# Patient Record
Sex: MALE | Race: WHITE | NOT HISPANIC OR LATINO | Employment: FULL TIME | URBAN - METROPOLITAN AREA
[De-identification: names, ages, dates, MRNs, and addresses within clinical notes are randomized per-mention and may not be internally consistent; named-entity substitution may affect disease eponyms.]

---

## 2024-02-28 ENCOUNTER — HOSPITAL ENCOUNTER (EMERGENCY)
Facility: HOSPITAL | Age: 64
Discharge: HOME | End: 2024-02-28
Attending: EMERGENCY MEDICINE
Payer: COMMERCIAL

## 2024-02-28 ENCOUNTER — APPOINTMENT (EMERGENCY)
Dept: RADIOLOGY | Facility: HOSPITAL | Age: 64
End: 2024-02-28
Payer: COMMERCIAL

## 2024-02-28 VITALS
BODY MASS INDEX: 19.88 KG/M2 | SYSTOLIC BLOOD PRESSURE: 118 MMHG | OXYGEN SATURATION: 100 % | RESPIRATION RATE: 18 BRPM | WEIGHT: 142 LBS | HEIGHT: 71 IN | TEMPERATURE: 98.2 F | DIASTOLIC BLOOD PRESSURE: 75 MMHG | HEART RATE: 72 BPM

## 2024-02-28 DIAGNOSIS — S92.354A CLOSED NONDISPLACED FRACTURE OF FIFTH METATARSAL BONE OF RIGHT FOOT, INITIAL ENCOUNTER: Primary | ICD-10-CM

## 2024-02-28 PROCEDURE — 99283 EMERGENCY DEPT VISIT LOW MDM: CPT

## 2024-02-28 PROCEDURE — 99284 EMERGENCY DEPT VISIT MOD MDM: CPT

## 2024-02-28 PROCEDURE — 73630 X-RAY EXAM OF FOOT: CPT

## 2024-02-28 RX ORDER — ACETAMINOPHEN 325 MG/1
650 TABLET ORAL ONCE
Status: COMPLETED | OUTPATIENT
Start: 2024-02-28 | End: 2024-02-28

## 2024-02-28 RX ADMIN — ACETAMINOPHEN 650 MG: 325 TABLET, FILM COATED ORAL at 23:03

## 2024-02-29 ENCOUNTER — OFFICE VISIT (OUTPATIENT)
Dept: PODIATRY | Facility: CLINIC | Age: 64
End: 2024-02-29
Payer: COMMERCIAL

## 2024-02-29 VITALS
DIASTOLIC BLOOD PRESSURE: 65 MMHG | SYSTOLIC BLOOD PRESSURE: 104 MMHG | BODY MASS INDEX: 19.88 KG/M2 | HEART RATE: 82 BPM | WEIGHT: 142 LBS | HEIGHT: 71 IN

## 2024-02-29 DIAGNOSIS — S92.354A CLOSED NONDISPLACED FRACTURE OF FIFTH METATARSAL BONE OF RIGHT FOOT, INITIAL ENCOUNTER: Primary | ICD-10-CM

## 2024-02-29 PROCEDURE — 28470 CLTX METATARSAL FX WO MNP EA: CPT | Performed by: PODIATRIST

## 2024-02-29 PROCEDURE — 99203 OFFICE O/P NEW LOW 30 MIN: CPT | Performed by: PODIATRIST

## 2024-02-29 RX ORDER — OXYCODONE HYDROCHLORIDE 5 MG/1
5 TABLET ORAL
COMMUNITY
Start: 2024-02-06

## 2024-02-29 RX ORDER — LANSOPRAZOLE 30 MG/1
30 CAPSULE, DELAYED RELEASE ORAL 2 TIMES DAILY
COMMUNITY
Start: 2024-02-15

## 2024-02-29 NOTE — DISCHARGE INSTRUCTIONS
Call orthopedic to schedule an appointment. Use crutches, do not put any weight on your right foot. Keep the splint clean and dry. Take acetaminophen (Tylenol) or ibuprofen (Motrin) as needed for pain control.

## 2024-02-29 NOTE — ED PROVIDER NOTES
History  Chief Complaint   Patient presents with   • Foot Injury     Pt reports he rolled his right ankle when getting up from the couch, reports falling, denies hitting head and denies thinners/ASA.     63-year-old male jumped off of his chair to go to his dog and inverted right foot      None       Past Medical History:   Diagnosis Date   • Esophageal cancer (HCC)        Past Surgical History:   Procedure Laterality Date   • ESOPHAGECTOMY         History reviewed. No pertinent family history.  I have reviewed and agree with the history as documented.    E-Cigarette/Vaping   • E-Cigarette Use Never User      E-Cigarette/Vaping Substances   • Nicotine No    • THC No    • CBD No    • Flavoring No    • Other No    • Unknown No      Social History     Tobacco Use   • Smoking status: Never   • Smokeless tobacco: Never   Vaping Use   • Vaping status: Never Used   Substance Use Topics   • Alcohol use: Never   • Drug use: Never       Review of Systems    Physical Exam  Physical Exam    Vital Signs  ED Triage Vitals [02/28/24 2216]   Temperature Pulse Respirations Blood Pressure SpO2   98.2 °F (36.8 °C) 72 18 118/75 100 %      Temp Source Heart Rate Source Patient Position - Orthostatic VS BP Location FiO2 (%)   Temporal Monitor -- Right arm --      Pain Score       5           Vitals:    02/28/24 2216   BP: 118/75   Pulse: 72         Visual Acuity      ED Medications  Medications - No data to display    Diagnostic Studies  Results Reviewed       None                   No orders to display              Procedures  Procedures         ED Course                                             Medical Decision Making           Disposition  Final diagnoses:   None     ED Disposition       None          Follow-up Information    None         Patient's Medications    No medications on file       No discharge procedures on file.    PDMP Review       None            ED Provider  Electronically Signed by

## 2024-02-29 NOTE — ED PROVIDER NOTES
History  Chief Complaint   Patient presents with    Foot Injury     Pt reports he rolled his right ankle when getting up from the couch, reports falling, denies hitting head and denies thinners/ASA.     Florentino is a 62 y/o M with PMH of esophageal cancer in remission presenting to the ER with the c/o right foot injury ~ 1 hour ago. Pt states his dog was barking so he was getting off his couch and he rolled his foot forward. He denies any other injury, did not hit his head, and is not on blood thinners. He did not take anything for the pain. He rates it a 6/10, worse with walking and touching it. He has never had a known fracture in this foot. Describes it as a dull, aching pain. Denies numbness, tingling, or bruising. Just c/o pain and swelling.           None       Past Medical History:   Diagnosis Date    Esophageal cancer (HCC)        Past Surgical History:   Procedure Laterality Date    ESOPHAGECTOMY         History reviewed. No pertinent family history.  I have reviewed and agree with the history as documented.    E-Cigarette/Vaping    E-Cigarette Use Never User      E-Cigarette/Vaping Substances    Nicotine No     THC No     CBD No     Flavoring No     Other No     Unknown No      Social History     Tobacco Use    Smoking status: Never    Smokeless tobacco: Never   Vaping Use    Vaping status: Never Used   Substance Use Topics    Alcohol use: Never    Drug use: Never       Review of Systems   Musculoskeletal:  Positive for arthralgias and joint swelling. Negative for gait problem.   Skin:  Negative for color change.   Neurological:  Negative for dizziness, weakness, light-headedness and numbness.   Psychiatric/Behavioral:  Negative for behavioral problems.    All other systems reviewed and are negative.      Physical Exam  Physical Exam  Vitals and nursing note reviewed.   Constitutional:       General: He is awake.      Appearance: Normal appearance. He is well-developed.   HENT:      Head: Normocephalic and  atraumatic.      Right Ear: External ear normal.      Left Ear: External ear normal.      Nose: Nose normal.      Mouth/Throat:      Mouth: Mucous membranes are moist.   Eyes:      General: No scleral icterus.     Extraocular Movements: Extraocular movements intact.   Cardiovascular:      Rate and Rhythm: Normal rate and regular rhythm.      Heart sounds: Normal heart sounds, S1 normal and S2 normal. No murmur heard.     No gallop.   Musculoskeletal:         General: Swelling, tenderness and signs of injury present. Normal range of motion.      Cervical back: Normal range of motion.      Comments: TTP & Edema to right lateral dorsal foot along 5th metatarsal in midfoot area. NV and Sensation intact. DP and PT 2+. No bruising or erythema.    Skin:     General: Skin is warm and dry.      Coloration: Skin is not pale.      Findings: No bruising, ecchymosis or erythema.   Neurological:      General: No focal deficit present.      Mental Status: He is alert and oriented to person, place, and time.      GCS: GCS eye subscore is 4. GCS verbal subscore is 5. GCS motor subscore is 6.      Sensory: No sensory deficit.      Motor: No weakness.      Gait: Gait normal.   Psychiatric:         Attention and Perception: Attention and perception normal.         Mood and Affect: Mood normal.         Behavior: Behavior normal. Behavior is cooperative.         Vital Signs  ED Triage Vitals [02/28/24 2216]   Temperature Pulse Respirations Blood Pressure SpO2   98.2 °F (36.8 °C) 72 18 118/75 100 %      Temp Source Heart Rate Source Patient Position - Orthostatic VS BP Location FiO2 (%)   Temporal Monitor -- Right arm --      Pain Score       5           Vitals:    02/28/24 2216   BP: 118/75   Pulse: 72         Visual Acuity      ED Medications  Medications   acetaminophen (TYLENOL) tablet 650 mg (650 mg Oral Given 2/28/24 2303)       Diagnostic Studies  Results Reviewed       None                   XR foot 3+ views RIGHT   ED  Interpretation by Sheree Gresham PA-C (02/28 2250)   Fracture base of 5th metatarsal consistent with simon fracture                  Procedures  Splint application    Date/Time: 2/28/2024 10:52 PM    Performed by: Sheree Gresham PA-C  Authorized by: Sheree Gresham PA-C  Universal Protocol:  Procedure performed by: (Kaylan TRINH)  Consent: Verbal consent obtained.  Risks and benefits: risks, benefits and alternatives were discussed  Consent given by: patient  Timeout called at: 2/28/2024 10:55 PM.  Patient understanding: patient states understanding of the procedure being performed  Radiology Images displayed and confirmed. If images not available, report reviewed: imaging studies available  Patient identity confirmed: verbally with patient    Pre-procedure details:     Sensation:  Normal    Skin color:  Pink  Procedure details:     Laterality:  Right    Location:  Foot    Foot:  R foot    Strapping: no      Splint type:  Short leg    Supplies:  Cotton padding  Post-procedure details:     Pain:  Unchanged    Sensation:  Normal    Skin color:  Pink    Patient tolerance of procedure:  Tolerated well, no immediate complications  Comments:      Splinting performed by Kaylan TRINH           ED Course                               SBIRT 22yo+      Flowsheet Row Most Recent Value   Initial Alcohol Screen: US AUDIT-C     1. How often do you have a drink containing alcohol? 0 Filed at: 02/28/2024 2308   2. How many drinks containing alcohol do you have on a typical day you are drinking?  0 Filed at: 02/28/2024 2308   3a. Male UNDER 65: How often do you have five or more drinks on one occasion? 0 Filed at: 02/28/2024 2308   3b. FEMALE Any Age, or MALE 65+: How often do you have 4 or more drinks on one occassion? 0 Filed at: 02/28/2024 2308   Audit-C Score 0 Filed at: 02/28/2024 2308   JELANI: How many times in the past year have you...    Used an illegal drug or used a prescription medication for  non-medical reasons? Never Filed at: 02/28/2024 2308                      Medical Decision Making  Ddx: Simon fx, Pseudojones fx, other L foot fracture, L foot contusion, ligament sprain     Plan: XR foot ordered. ER interpretation- fx seen at base of 5th metatarsal. Will splint and provide crutches for strict non-weight bearing suspicious of simon fracture and will instruct f/u with podiatry. Referral placed. Supportive care/RICE.  Patient was given strict return to ER precautions both verbally and in discharge papers. Patient verbalized understanding and agrees with plan.       Amount and/or Complexity of Data Reviewed  Radiology: ordered and independent interpretation performed.    Risk  OTC drugs.             Disposition  Final diagnoses:   Closed nondisplaced fracture of fifth metatarsal bone of right foot, initial encounter     Time reflects when diagnosis was documented in both MDM as applicable and the Disposition within this note       Time User Action Codes Description Comment    2/28/2024 11:22 PM Sheree Gresham Add [S92.354A] Closed nondisplaced fracture of fifth metatarsal bone of right foot, initial encounter           ED Disposition       ED Disposition   Discharge    Condition   Stable    Date/Time   Wed Feb 28, 2024 2322    Comment   Florentino Maier discharge to home/self care.                   Follow-up Information       Follow up With Specialties Details Why Contact Info Additional Information     [facility] Emergency Department Emergency Medicine Go to  If symptoms worsen [address] [address] [phone] [facility] Emergency Department, [address] [place], [place] [address]    [facility]   [address] [id number] [phone] [facility], [address], [address] [phone]             There are no discharge medications for this patient.          PDMP Review       None            ED Provider  Electronically Signed by             Sheree Gresham PA-C  02/29/24 0138

## 2024-02-29 NOTE — PROGRESS NOTES
"Patient ID: Florentino Maier is a 63 y.o. male Date of Birth 1960       Chief Complaint   Patient presents with    Foot Pain     Right foot fracture             Diagnosis:  1. Closed nondisplaced fracture of fifth metatarsal bone of right foot, initial encounter  -     Cam Boot  -     Ambulatory Referral to Podiatry  -     XR foot 3+ vw right; Future; Expected date: 03/29/2024      Initial pedal examination with socks and shoes removed bilaterally.  I personally reviewed patient's ED visit note from yesterday, x-rays from yesterday showing fifth metatarsal base fracture, nondisplaced in good alignment.  Patient was transition from posterior splint to low tide Cam boot, he is to maintain minimal to nonweightbearing to left foot, he may put pressure on his heel for balance with use of crutches.  Patient is advised this is his right foot, he cannot drive.  Frequent elevation, strict pressure and friction reduction, Ace wrap for compression was reviewed with patient, he may remove cam boot to sleep and shower but if he is weightbearing he needs to wear it at all times.  X-ray was ordered for patient to have taken in 4 weeks.  He will follow-up at that time.  He understands and agrees with the plan.      Orthopedic injury treatment    Date/Time: 2/29/2024 9:30 AM    Performed by: Yareli Flowers DPM  Authorized by: Yareli Flowers DPM    Patient Location:  [facility] Protocol:  Consent: Verbal consent obtained.  Risks and benefits: risks, benefits and alternatives were discussed  Consent given by: patient  Time out: Immediately prior to procedure a \"time out\" was called to verify the correct patient, procedure, equipment, support staff and site/side marked as required.  Patient identity confirmed: verbally with patient    Injury location:  Foot  Location details:  Right foot  Injury type:  Fracture  Fracture type: fifth metatarsal    Neurovascular status: Neurovascularly intact    Splint type:  Short leg (CAM " boot)  Neurovascular status: Neurovascularly intact         Subjective:   Florentino presents today upon referral from ED for right fifth metatarsal base fracture, yesterday he was trying to stop his dogs and got tangled up and fell, rolled his ankle, was seen in ED, x-ray taken and he was placed in a posterior splint and given crutches.  He states his foot feels good as long as it is wrapped.        The following portions of the patient's history were reviewed and updated as appropriate:     Past Medical History:   Diagnosis Date    Esophageal cancer (HCC)        Past Surgical History:   Procedure Laterality Date    ESOPHAGECTOMY         Social History     Socioeconomic History    Marital status: /Civil Union     Spouse name: None    Number of children: None    Years of education: None    Highest education level: None   Occupational History    None   Tobacco Use    Smoking status: Never    Smokeless tobacco: Never   Vaping Use    Vaping status: Never Used   Substance and Sexual Activity    Alcohol use: Never    Drug use: Never    Sexual activity: None   Other Topics Concern    None   Social History Narrative    None     Social Determinants of Health     Financial Resource Strain: Not on file   Food Insecurity: Not on file   Transportation Needs: Not on file   Physical Activity: Not on file   Stress: Not on file   Social Connections: Not on file   Intimate Partner Violence: Not on file   Housing Stability: Not on file          Current Outpatient Medications:     ibuprofen (ADVIL,MOTRIN) 100 MG tablet, 100 mg, Disp: , Rfl:     lansoprazole (PREVACID) 30 mg capsule, Take 30 mg by mouth 2 (two) times a day, Disp: , Rfl:     oxyCODONE (ROXICODONE) 5 immediate release tablet, Take 5 mg by mouth, Disp: , Rfl:   No current facility-administered medications for this visit.    Allergies  Patient has no known allergies.    History reviewed. No pertinent family history.        Objective:  /65 (BP Location: Right arm,  "Patient Position: Sitting, Cuff Size: Adult)   Pulse 82   Ht 5' 11\" (1.803 m) Comment: verbal  Wt 64.4 kg (142 lb) Comment: verbal  BMI 19.80 kg/m²     Review of Systems   Constitutional:  Negative for chills and fever.   HENT:  Negative for ear pain and sore throat.    Eyes:  Negative for pain and visual disturbance.   Respiratory:  Negative for cough and shortness of breath.    Cardiovascular:  Negative for chest pain and palpitations.   Gastrointestinal:  Negative for abdominal pain and vomiting.   Genitourinary:  Negative for dysuria and hematuria.   Musculoskeletal:  Negative for arthralgias and back pain.        Right foot fracture   Skin:  Negative for color change and rash.   Neurological:  Negative for seizures and syncope.   All other systems reviewed and are negative.      Physical Exam  Constitutional:       Appearance: Normal appearance. He is normal weight.   HENT:      Head: Normocephalic and atraumatic.      Right Ear: External ear normal.      Left Ear: External ear normal.      Nose: Nose normal.      Mouth/Throat:      Mouth: Mucous membranes are moist.      Pharynx: Oropharynx is clear.   Eyes:      Conjunctiva/sclera: Conjunctivae normal.      Pupils: Pupils are equal, round, and reactive to light.   Cardiovascular:      Pulses: Normal pulses.           Dorsalis pedis pulses are 2+ on the right side and 2+ on the left side.        Posterior tibial pulses are 2+ on the right side and 2+ on the left side.   Pulmonary:      Effort: Pulmonary effort is normal.   Musculoskeletal:      Cervical back: Normal range of motion.      Right lower leg: No edema.      Left lower leg: No edema.   Feet:      Right foot:      Protective Sensation: 10 sites tested.  10 sites sensed.      Skin integrity: Skin integrity normal.      Toenail Condition: Right toenails are normal.      Left foot:      Protective Sensation: 10 sites tested.  10 sites sensed.      Skin integrity: Skin integrity normal.      Toenail " "Condition: Left toenails are normal.      Comments: Right foot, positive edema, ecchymosis to lateral right foot fifth metatarsal base, tenderness on palpation, range of motion and manual muscle testing was deferred secondary to fracture.  Left foot is within normal limits.  Neurological:      Mental Status: He is alert. Mental status is at baseline.   Psychiatric:         Mood and Affect: Mood normal.         Behavior: Behavior normal.                No pertinent results found.      Yareli Flowers, KOLE, DPLANDON, FACFAS    Portions of the record may have been created with voice recognition software. Occasional wrong word or \"sound a like\" substitutions may have occurred due to the inherent limitations of voice recognition software. Read the chart carefully and recognize, using context, where substitutions have occurred.  "

## 2024-03-29 ENCOUNTER — OFFICE VISIT (OUTPATIENT)
Dept: PODIATRY | Facility: CLINIC | Age: 64
End: 2024-03-29

## 2024-03-29 ENCOUNTER — APPOINTMENT (OUTPATIENT)
Dept: RADIOLOGY | Facility: CLINIC | Age: 64
End: 2024-03-29
Payer: COMMERCIAL

## 2024-03-29 VITALS
SYSTOLIC BLOOD PRESSURE: 106 MMHG | HEART RATE: 94 BPM | WEIGHT: 152 LBS | BODY MASS INDEX: 21.28 KG/M2 | DIASTOLIC BLOOD PRESSURE: 71 MMHG | HEIGHT: 71 IN

## 2024-03-29 DIAGNOSIS — S92.354A CLOSED NONDISPLACED FRACTURE OF FIFTH METATARSAL BONE OF RIGHT FOOT, INITIAL ENCOUNTER: ICD-10-CM

## 2024-03-29 DIAGNOSIS — S92.354D CLOSED NONDISPLACED FRACTURE OF FIFTH METATARSAL BONE OF RIGHT FOOT WITH ROUTINE HEALING, SUBSEQUENT ENCOUNTER: Primary | ICD-10-CM

## 2024-03-29 PROCEDURE — 73630 X-RAY EXAM OF FOOT: CPT

## 2024-03-29 PROCEDURE — 99024 POSTOP FOLLOW-UP VISIT: CPT | Performed by: PODIATRIST

## 2024-03-29 NOTE — PROGRESS NOTES
Patient ID: Florentino Maier is a 63 y.o. male Date of Birth 1960       Chief Complaint   Patient presents with    Foot Injury     Right foot 4wks  f/u             Diagnosis:  1. Closed nondisplaced fracture of fifth metatarsal bone of right foot with routine healing, subsequent encounter  -     Cam Boot  -     XR foot 3+ vw right; Future; Expected date: 04/12/2024      Pedal examination with socks and shoes removed bilaterally.  I personally viewed patient's x-rays from today compared with prior showing  right fifth metatarsal base fracture with some increased lucency noted, fracture continues to be in good alignment, no displacement is noted.  As patient's cam boot is broken and he has not been able to wear it for the last week to 10 days, a new cam boot was fitted to his foot, he is advised to stay off of his foot to allow fracture to completely heal, I explained to patient if fracture does not heal and it displaces he can have prolonged pain, swelling, potentially for surgical repair.  Patient will follow-up in 3 to 4 weeks, x-ray was ordered for him to have taken prior to his visit.  He understands and agrees with the plan.        Subjective:   Florentino presents today for follow-up of right fifth metatarsal base fracture, he was seen in our office on 2/29/2024 from referral from ED after getting tangled up with his dogs and rolling his ankle.  He presents today full weightbearing in sneakers, he states his cam boot broke, first the strap and then the plastic about a week to 10 days ago so he stopped wearing it.  When asked why the boot broke, he states it is probably because he was too hard on it.  He has resumed all activities with some discomfort.          The following portions of the patient's history were reviewed and updated as appropriate: allergies, current medications, past family history, past medical history, past social history, past surgical history, and problem list.        Objective:  /71  "(BP Location: Left arm, Patient Position: Sitting, Cuff Size: Adult)   Pulse 94   Ht 5' 11\" (1.803 m)   Wt 68.9 kg (152 lb)   BMI 21.20 kg/m²     Review of Systems   Constitutional:  Negative for chills and fever.   HENT:  Negative for ear pain and sore throat.    Eyes:  Negative for pain and visual disturbance.   Respiratory:  Negative for cough and shortness of breath.    Cardiovascular:  Negative for chest pain and palpitations.   Gastrointestinal:  Negative for abdominal pain and vomiting.   Genitourinary:  Negative for dysuria and hematuria.   Musculoskeletal:  Negative for arthralgias and back pain.        Fracture right foot    Skin:  Negative for color change and rash.   Neurological:  Negative for seizures and syncope.   All other systems reviewed and are negative.      Physical Exam  Constitutional:       Appearance: Normal appearance. He is normal weight.   HENT:      Head: Normocephalic and atraumatic.      Right Ear: External ear normal.      Left Ear: External ear normal.      Nose: Nose normal.      Mouth/Throat:      Mouth: Mucous membranes are moist.      Pharynx: Oropharynx is clear.   Eyes:      Conjunctiva/sclera: Conjunctivae normal.      Pupils: Pupils are equal, round, and reactive to light.   Cardiovascular:      Pulses: Normal pulses.           Dorsalis pedis pulses are 2+ on the right side and 2+ on the left side.        Posterior tibial pulses are 2+ on the right side and 2+ on the left side.   Pulmonary:      Effort: Pulmonary effort is normal.   Musculoskeletal:      Cervical back: Normal range of motion.      Right lower leg: No edema.      Left lower leg: No edema.   Feet:      Right foot:      Protective Sensation: 10 sites tested.  10 sites sensed.      Skin integrity: Skin integrity normal.      Toenail Condition: Right toenails are normal.      Left foot:      Protective Sensation: 10 sites tested.  10 sites sensed.      Skin integrity: Skin integrity normal.      Toenail " "Condition: Left toenails are normal.      Comments: Right foot fifth metatarsal base styloid process, + edema, no erythema or ecchymosis, + pain on palpation of dorsal fifth metatarsal base is noted  Skin:     General: Skin is warm and dry.      Capillary Refill: Capillary refill takes less than 2 seconds.   Neurological:      General: No focal deficit present.      Mental Status: He is alert and oriented to person, place, and time. Mental status is at baseline.   Psychiatric:         Mood and Affect: Mood normal.         Behavior: Behavior normal.         Thought Content: Thought content normal.         Judgment: Judgment normal.              XR foot 3+ views RIGHT    Result Date: 2/29/2024  Narrative: XR FOOT 3+ VW RIGHT INDICATION: Pain on lateral aspect of foot. Mechanism of injury: Fall with twisting of ankle. COMPARISON: None FINDINGS: Nondisplaced fracture at the lateral base of the fifth metatarsal. Moderate degenerative changes of the first metatarsophalangeal joint. No lytic or blastic osseous lesion. Soft tissue swelling at the base of the fifth metatarsal.     Impression: Nondisplaced fracture at the base of the fifth metatarsal with overlying soft tissue swelling. Resident: ANEUDY KRUGER I, the attending radiologist, have reviewed the images and agree with the final report above. Workstation performed: [id number]                   Yareli Flowers DPM, KOLE, FACFAS    Portions of the record may have been created with voice recognition software. Occasional wrong word or \"sound a like\" substitutions may have occurred due to the inherent limitations of voice recognition software. Read the chart carefully and recognize, using context, where substitutions have occurred.  "

## 2024-04-19 ENCOUNTER — OFFICE VISIT (OUTPATIENT)
Dept: PODIATRY | Facility: CLINIC | Age: 64
End: 2024-04-19

## 2024-04-19 ENCOUNTER — APPOINTMENT (OUTPATIENT)
Dept: RADIOLOGY | Facility: CLINIC | Age: 64
End: 2024-04-19
Payer: COMMERCIAL

## 2024-04-19 VITALS — DIASTOLIC BLOOD PRESSURE: 68 MMHG | SYSTOLIC BLOOD PRESSURE: 100 MMHG | BODY MASS INDEX: 21.2 KG/M2 | HEIGHT: 71 IN

## 2024-04-19 DIAGNOSIS — S92.354D CLOSED NONDISPLACED FRACTURE OF FIFTH METATARSAL BONE OF RIGHT FOOT WITH ROUTINE HEALING, SUBSEQUENT ENCOUNTER: ICD-10-CM

## 2024-04-19 DIAGNOSIS — S92.354D CLOSED NONDISPLACED FRACTURE OF FIFTH METATARSAL BONE OF RIGHT FOOT WITH ROUTINE HEALING, SUBSEQUENT ENCOUNTER: Primary | ICD-10-CM

## 2024-04-19 PROCEDURE — 99024 POSTOP FOLLOW-UP VISIT: CPT | Performed by: PODIATRIST

## 2024-04-19 PROCEDURE — 73630 X-RAY EXAM OF FOOT: CPT

## 2024-04-19 NOTE — TELEPHONE ENCOUNTER
Caller: Patient    Doctor: Kristie    Reason for call: Patient calling and asking if he needs a xray before his visit today at 1:15pm.  After looking in his chart, there is no appt listed.  He stated he was given the appt when he left the office at the last visit.  According to the office note from last time, this would be correct. Tried to call office several times with no answer.  Please advise.    Call back#: [phone]

## 2024-04-19 NOTE — PROGRESS NOTES
"Patient ID: Florentino Maier is a 63 y.o. male Date of Birth 1960       Chief Complaint   Patient presents with    Foot Injury     Right foot follow up              Diagnosis:  1. Closed nondisplaced fracture of fifth metatarsal bone of right foot with routine healing, subsequent encounter      Pedal examination with socks and shoes removed bilaterally.  I personally viewed patient's x-rays from today compared with prior showing  right fifth metatarsal base fracture with well-healed fracture fifth metatarsal base which continues to be in good alignment, no displacement is noted.  As fracture is healed and patient does not have any discomfort or pain he is to transition to a sneaker, work boot, hiking boot, increase activities as tolerated.  He understands and agrees with the plan.  And will follow-up as needed.        Subjective:   Florentino presents today for follow-up of right fifth metatarsal base fracture, he was seen in our office on 2/29/2024 from referral from ED after getting tangled up with his dogs and rolling his ankle.  He presents today full weightbearing in cam boot, and states he is feeling well.          The following portions of the patient's history were reviewed and updated as appropriate: allergies, current medications, past family history, past medical history, past social history, past surgical history, and problem list.        Objective:  /68 (BP Location: Left arm, Patient Position: Sitting, Cuff Size: Adult)   Ht 5' 11\" (1.803 m)   BMI 21.20 kg/m²     Review of Systems   Constitutional:  Negative for chills and fever.   HENT:  Negative for ear pain and sore throat.    Eyes:  Negative for pain and visual disturbance.   Respiratory:  Negative for cough and shortness of breath.    Cardiovascular:  Negative for chest pain and palpitations.   Gastrointestinal:  Negative for abdominal pain and vomiting.   Genitourinary:  Negative for dysuria and hematuria.   Musculoskeletal:  Negative for " arthralgias and back pain.        Fracture right foot    Skin:  Negative for color change and rash.   Neurological:  Negative for seizures and syncope.   All other systems reviewed and are negative.      Physical Exam  Constitutional:       Appearance: Normal appearance. He is normal weight.   HENT:      Head: Normocephalic and atraumatic.      Right Ear: External ear normal.      Left Ear: External ear normal.      Nose: Nose normal.      Mouth/Throat:      Mouth: Mucous membranes are moist.      Pharynx: Oropharynx is clear.   Eyes:      Conjunctiva/sclera: Conjunctivae normal.      Pupils: Pupils are equal, round, and reactive to light.   Cardiovascular:      Pulses: Normal pulses.           Dorsalis pedis pulses are 2+ on the right side and 2+ on the left side.        Posterior tibial pulses are 2+ on the right side and 2+ on the left side.   Pulmonary:      Effort: Pulmonary effort is normal.   Musculoskeletal:      Cervical back: Normal range of motion.      Right lower leg: No edema.      Left lower leg: No edema.   Feet:      Right foot:      Protective Sensation: 10 sites tested.  10 sites sensed.      Skin integrity: Skin integrity normal.      Toenail Condition: Right toenails are normal.      Left foot:      Protective Sensation: 10 sites tested.  10 sites sensed.      Skin integrity: Skin integrity normal.      Toenail Condition: Left toenails are normal.      Comments: Right foot fifth metatarsal base styloid process, no edema, no erythema or ecchymosis, no pain on palpation of dorsal fifth metatarsal base is noted  Skin:     General: Skin is warm and dry.      Capillary Refill: Capillary refill takes less than 2 seconds.   Neurological:      General: No focal deficit present.      Mental Status: He is alert and oriented to person, place, and time. Mental status is at baseline.   Psychiatric:         Mood and Affect: Mood normal.         Behavior: Behavior normal.         Thought Content: Thought  "content normal.         Judgment: Judgment normal.              XR foot 3+ views RIGHT    Result Date: 2/29/2024  Narrative: XR FOOT 3+ VW RIGHT INDICATION: Pain on lateral aspect of foot. Mechanism of injury: Fall with twisting of ankle. COMPARISON: None FINDINGS: Nondisplaced fracture at the lateral base of the fifth metatarsal. Moderate degenerative changes of the first metatarsophalangeal joint. No lytic or blastic osseous lesion. Soft tissue swelling at the base of the fifth metatarsal.     Impression: Nondisplaced fracture at the base of the fifth metatarsal with overlying soft tissue swelling. Resident: ANEUDY KRUGER I, the attending radiologist, have reviewed the images and agree with the final report above. Workstation performed: [id number]                   Yareli Flowers DPM, KOLE, FACFAS    Portions of the record may have been created with voice recognition software. Occasional wrong word or \"sound a like\" substitutions may have occurred due to the inherent limitations of voice recognition software. Read the chart carefully and recognize, using context, where substitutions have occurred.  "